# Patient Record
Sex: FEMALE | Race: ASIAN | NOT HISPANIC OR LATINO | URBAN - METROPOLITAN AREA
[De-identification: names, ages, dates, MRNs, and addresses within clinical notes are randomized per-mention and may not be internally consistent; named-entity substitution may affect disease eponyms.]

---

## 2022-08-11 VITALS
SYSTOLIC BLOOD PRESSURE: 161 MMHG | WEIGHT: 126.1 LBS | TEMPERATURE: 98 F | RESPIRATION RATE: 16 BRPM | OXYGEN SATURATION: 99 % | DIASTOLIC BLOOD PRESSURE: 88 MMHG | HEIGHT: 66 IN | HEART RATE: 76 BPM

## 2022-08-11 RX ORDER — CHLORHEXIDINE GLUCONATE 213 G/1000ML
1 SOLUTION TOPICAL ONCE
Refills: 0 | Status: DISCONTINUED | OUTPATIENT
Start: 2022-08-15 | End: 2022-08-30

## 2022-08-11 NOTE — H&P ADULT - HISTORY OF PRESENT ILLNESS
COVID: ________  Cardiologist: Dr. Miguel Gamble   Pharmacy: [facility]  (in [place])  Escort: ________    83 y/o _____ speaking female w/ PMHx HTN, HLD, and Type 2 DM who presented to outpatient cardiologist, Dr. Gamble, endorsing substernal, non-radiating, chest pressure w/ moderate exertion that has been progressively worsening over the past few weeks. Patient denies any dizziness, diaphoresis, syncope, palpitations, SOB/AGUILAR, abdominal pain, nausea/vomiting, melena, LE edema, fever, chills, and cough. Echocardiogram (06/17/2021) showed EF 64%, mild concentric LV hypertrophy, mild AR, mild MR, and mild TR. NST (04/13/2022) revealed moderate perfusion abnormality of moderate intensity in the lateral region and post stress EF 64% w/ normal wall thickness and wall motion.     In light of patient's risk factors, CCS Class II anginal symptoms, and abnormal NST results, patient now presents for cardiac catheterization w/ possible intervention if clinically indicated.  COVID:   Cardiologist: Dr. Miguel Gamble   Pharmacy: [facility]  (in [place])  Escort: ________    83 y/o _____ speaking female w/ PMHx HTN, HLD, and Type 2 DM who presented to outpatient cardiologist, Dr. Gamble, endorsing substernal, non-radiating, chest pressure w/ moderate exertion that has been progressively worsening over the past few weeks. Patient denies any dizziness, diaphoresis, syncope, palpitations, SOB/AGUILAR, abdominal pain, nausea/vomiting, melena, LE edema, fever, chills, and cough. Echocardiogram (06/17/2021) showed EF 64%, mild concentric LV hypertrophy, mild AR, mild MR, and mild TR. NST (04/13/2022) revealed moderate perfusion abnormality of moderate intensity in the lateral region and post stress EF 64% w/ normal wall thickness and wall motion.     In light of patient's risk factors, CCS Class II anginal symptoms, and abnormal NST results, patient now presents for cardiac catheterization w/ possible intervention if clinically indicated.  COVID: Negative 8/11 ([facility])  Cardiologist: Dr. Miguel Gamble   Pharmacy: [facility]  (in [place])  Escort: Son    81 yo Mandarin speaking female w/ PMHx HTN, HLD, and Type 2 DM who presented to outpatient cardiologist, Dr. Gamble, endorsing substernal, non-radiating, chest pressure w/ moderate exertion that has been progressively worsening over the past few weeks. Patient denies any dizziness, diaphoresis, syncope, palpitations, SOB/AGUILAR, abdominal pain, nausea/vomiting, melena, LE edema, fever, chills, and cough. Echocardiogram (06/17/2021) showed EF 64%, mild concentric LV hypertrophy, mild AR, mild MR, and mild TR. NST (04/13/2022) revealed moderate perfusion abnormality of moderate intensity in the lateral region and post stress EF 64% w/ normal wall thickness and wall motion.     In light of patient's risk factors, CCS Class II anginal symptoms, and abnormal NST results, patient now presents for cardiac catheterization w/ possible intervention if clinically indicated.

## 2022-08-11 NOTE — H&P ADULT - ASSESSMENT
83 y/o _____ speaking female w/ PMHx HTN, HLD, and Type 2 DM who is presenting for cardiac catheterization due to patient's risk factors, CCS Class II anginal symptoms, and abnormal NST results.    -H/H: ____. Pt denies BRBPR, hematuria, hematochezia, melena. Pt loaded ___mg ASA x1 and Plavix 600mg x1  -Cr: _____. EF normal. Euvolemic on exam.  ml bolus administered and IVF @ 75cc/hr started pre procedure  -DM: Insulin Sliding Scale Coverage ordered  -Cardiac Catheterization ordered placed   -Home Medication Confirmed via: Medication verified by ____  -Type of sedation: Moderate  -Candidate for sedation: Yes    Risks & benefits of procedure and alternative therapy have been explained to the patient including but not limited to: allergic reaction, bleeding w/possible need for blood transfusion, infection, renal and vascular compromise, limb damage, arrhythmia, stroke, vessel dissection/perforation, Myocardial infarction, emergent CABG. Informed consent obtained and in chart.   81 y/o Mandarin speaking female w/ PMHx HTN, HLD, and Type 2 DM who is presenting for cardiac catheterization due to patient's risk factors, CCS Class II anginal symptoms, and abnormal NST results.    -H/H: 13.2/38.3. Pt denies BRBPR, hematuria, hematochezia, melena. Pt loaded 325mg ASA x1 and Plavix 600mg x1  -Cr: 0.84. EF normal. Euvolemic on exam.  ml bolus administered and IVF @ 75cc/hr started pre procedure  -K 3.7. Ordered for 40 meq KCl x1  -DM: Insulin Sliding Scale Coverage ordered  -Cardiac Catheterization ordered placed   -Home Medication Confirmed via: Medication verified by calling the patient's pharmacy   -Type of sedation: Moderate  -Candidate for sedation: Yes    Risks & benefits of procedure and alternative therapy have been explained to the patient including but not limited to: allergic reaction, bleeding w/possible need for blood transfusion, infection, renal and vascular compromise, limb damage, arrhythmia, stroke, vessel dissection/perforation, Myocardial infarction, emergent CABG. Informed consent obtained and in chart.

## 2022-08-15 ENCOUNTER — OUTPATIENT (OUTPATIENT)
Dept: OUTPATIENT SERVICES | Facility: HOSPITAL | Age: 82
LOS: 1 days | Discharge: HOME | End: 2022-08-15
Payer: MEDICARE

## 2022-08-15 LAB
A1C WITH ESTIMATED AVERAGE GLUCOSE RESULT: 6.1 % — HIGH (ref 4–5.6)
ALBUMIN SERPL ELPH-MCNC: 4.5 G/DL — SIGNIFICANT CHANGE UP (ref 3.3–5)
ALP SERPL-CCNC: 55 U/L — SIGNIFICANT CHANGE UP (ref 40–120)
ALT FLD-CCNC: 19 U/L — SIGNIFICANT CHANGE UP (ref 10–45)
ANION GAP SERPL CALC-SCNC: 11 MMOL/L — SIGNIFICANT CHANGE UP (ref 5–17)
APTT BLD: 32.4 SEC — SIGNIFICANT CHANGE UP (ref 27.5–35.5)
AST SERPL-CCNC: 31 U/L — SIGNIFICANT CHANGE UP (ref 10–40)
BASOPHILS # BLD AUTO: 0.02 K/UL — SIGNIFICANT CHANGE UP (ref 0–0.2)
BASOPHILS NFR BLD AUTO: 0.4 % — SIGNIFICANT CHANGE UP (ref 0–2)
BILIRUB SERPL-MCNC: 0.3 MG/DL — SIGNIFICANT CHANGE UP (ref 0.2–1.2)
BUN SERPL-MCNC: 19 MG/DL — SIGNIFICANT CHANGE UP (ref 7–23)
CALCIUM SERPL-MCNC: 9.7 MG/DL — SIGNIFICANT CHANGE UP (ref 8.4–10.5)
CHLORIDE SERPL-SCNC: 98 MMOL/L — SIGNIFICANT CHANGE UP (ref 96–108)
CHOLEST SERPL-MCNC: 138 MG/DL — SIGNIFICANT CHANGE UP
CK MB CFR SERPL CALC: 3 NG/ML — SIGNIFICANT CHANGE UP (ref 0–6.7)
CK SERPL-CCNC: 146 U/L — SIGNIFICANT CHANGE UP (ref 25–170)
CO2 SERPL-SCNC: 23 MMOL/L — SIGNIFICANT CHANGE UP (ref 22–31)
CREAT SERPL-MCNC: 0.84 MG/DL — SIGNIFICANT CHANGE UP (ref 0.5–1.3)
EGFR: 70 ML/MIN/1.73M2 — SIGNIFICANT CHANGE UP
EOSINOPHIL # BLD AUTO: 0.13 K/UL — SIGNIFICANT CHANGE UP (ref 0–0.5)
EOSINOPHIL NFR BLD AUTO: 2.5 % — SIGNIFICANT CHANGE UP (ref 0–6)
ESTIMATED AVERAGE GLUCOSE: 128 MG/DL — HIGH (ref 68–114)
GLUCOSE BLDC GLUCOMTR-MCNC: 115 MG/DL — HIGH (ref 70–99)
GLUCOSE SERPL-MCNC: 110 MG/DL — HIGH (ref 70–99)
HCT VFR BLD CALC: 38.3 % — SIGNIFICANT CHANGE UP (ref 34.5–45)
HDLC SERPL-MCNC: 63 MG/DL — SIGNIFICANT CHANGE UP
HGB BLD-MCNC: 13.2 G/DL — SIGNIFICANT CHANGE UP (ref 11.5–15.5)
IMM GRANULOCYTES NFR BLD AUTO: 0.2 % — SIGNIFICANT CHANGE UP (ref 0–1.5)
INR BLD: 0.94 — SIGNIFICANT CHANGE UP (ref 0.88–1.16)
LIPID PNL WITH DIRECT LDL SERPL: 34 MG/DL — SIGNIFICANT CHANGE UP
LYMPHOCYTES # BLD AUTO: 2.05 K/UL — SIGNIFICANT CHANGE UP (ref 1–3.3)
LYMPHOCYTES # BLD AUTO: 38.7 % — SIGNIFICANT CHANGE UP (ref 13–44)
MCHC RBC-ENTMCNC: 32 PG — SIGNIFICANT CHANGE UP (ref 27–34)
MCHC RBC-ENTMCNC: 34.5 GM/DL — SIGNIFICANT CHANGE UP (ref 32–36)
MCV RBC AUTO: 93 FL — SIGNIFICANT CHANGE UP (ref 80–100)
MONOCYTES # BLD AUTO: 0.51 K/UL — SIGNIFICANT CHANGE UP (ref 0–0.9)
MONOCYTES NFR BLD AUTO: 9.6 % — SIGNIFICANT CHANGE UP (ref 2–14)
NEUTROPHILS # BLD AUTO: 2.58 K/UL — SIGNIFICANT CHANGE UP (ref 1.8–7.4)
NEUTROPHILS NFR BLD AUTO: 48.6 % — SIGNIFICANT CHANGE UP (ref 43–77)
NON HDL CHOLESTEROL: 75 MG/DL — SIGNIFICANT CHANGE UP
NRBC # BLD: 0 /100 WBCS — SIGNIFICANT CHANGE UP (ref 0–0)
PLATELET # BLD AUTO: 193 K/UL — SIGNIFICANT CHANGE UP (ref 150–400)
POTASSIUM SERPL-MCNC: 3.7 MMOL/L — SIGNIFICANT CHANGE UP (ref 3.5–5.3)
POTASSIUM SERPL-SCNC: 3.7 MMOL/L — SIGNIFICANT CHANGE UP (ref 3.5–5.3)
PROT SERPL-MCNC: 7.7 G/DL — SIGNIFICANT CHANGE UP (ref 6–8.3)
PROTHROM AB SERPL-ACNC: 11.2 SEC — SIGNIFICANT CHANGE UP (ref 10.5–13.4)
RBC # BLD: 4.12 M/UL — SIGNIFICANT CHANGE UP (ref 3.8–5.2)
RBC # FLD: 13.1 % — SIGNIFICANT CHANGE UP (ref 10.3–14.5)
SODIUM SERPL-SCNC: 132 MMOL/L — LOW (ref 135–145)
TRIGL SERPL-MCNC: 205 MG/DL — HIGH
WBC # BLD: 5.3 K/UL — SIGNIFICANT CHANGE UP (ref 3.8–10.5)
WBC # FLD AUTO: 5.3 K/UL — SIGNIFICANT CHANGE UP (ref 3.8–10.5)

## 2022-08-15 PROCEDURE — 93458 L HRT ARTERY/VENTRICLE ANGIO: CPT | Mod: 26

## 2022-08-15 PROCEDURE — 99152 MOD SED SAME PHYS/QHP 5/>YRS: CPT

## 2022-08-15 PROCEDURE — 85730 THROMBOPLASTIN TIME PARTIAL: CPT

## 2022-08-15 PROCEDURE — 93005 ELECTROCARDIOGRAM TRACING: CPT

## 2022-08-15 PROCEDURE — C1769: CPT

## 2022-08-15 PROCEDURE — 85610 PROTHROMBIN TIME: CPT

## 2022-08-15 PROCEDURE — 83036 HEMOGLOBIN GLYCOSYLATED A1C: CPT

## 2022-08-15 PROCEDURE — C1894: CPT

## 2022-08-15 PROCEDURE — 93010 ELECTROCARDIOGRAM REPORT: CPT

## 2022-08-15 PROCEDURE — 82550 ASSAY OF CK (CPK): CPT

## 2022-08-15 PROCEDURE — 82962 GLUCOSE BLOOD TEST: CPT

## 2022-08-15 PROCEDURE — 36415 COLL VENOUS BLD VENIPUNCTURE: CPT

## 2022-08-15 PROCEDURE — 85025 COMPLETE CBC W/AUTO DIFF WBC: CPT

## 2022-08-15 PROCEDURE — 82553 CREATINE MB FRACTION: CPT

## 2022-08-15 PROCEDURE — C1887: CPT

## 2022-08-15 PROCEDURE — 80053 COMPREHEN METABOLIC PANEL: CPT

## 2022-08-15 PROCEDURE — 93458 L HRT ARTERY/VENTRICLE ANGIO: CPT

## 2022-08-15 PROCEDURE — 80061 LIPID PANEL: CPT

## 2022-08-15 RX ORDER — METFORMIN HYDROCHLORIDE 850 MG/1
1 TABLET ORAL
Qty: 0 | Refills: 0 | DISCHARGE

## 2022-08-15 RX ORDER — ROSUVASTATIN CALCIUM 5 MG/1
1 TABLET ORAL
Qty: 0 | Refills: 0 | DISCHARGE

## 2022-08-15 RX ORDER — SODIUM CHLORIDE 9 MG/ML
250 INJECTION INTRAMUSCULAR; INTRAVENOUS; SUBCUTANEOUS ONCE
Refills: 0 | Status: COMPLETED | OUTPATIENT
Start: 2022-08-15 | End: 2022-08-15

## 2022-08-15 RX ORDER — CLOPIDOGREL BISULFATE 75 MG/1
600 TABLET, FILM COATED ORAL ONCE
Refills: 0 | Status: COMPLETED | OUTPATIENT
Start: 2022-08-15 | End: 2022-08-15

## 2022-08-15 RX ORDER — SODIUM CHLORIDE 9 MG/ML
500 INJECTION INTRAMUSCULAR; INTRAVENOUS; SUBCUTANEOUS
Refills: 0 | Status: DISCONTINUED | OUTPATIENT
Start: 2022-08-15 | End: 2022-08-30

## 2022-08-15 RX ORDER — ASPIRIN/CALCIUM CARB/MAGNESIUM 324 MG
325 TABLET ORAL ONCE
Refills: 0 | Status: DISCONTINUED | OUTPATIENT
Start: 2022-08-15 | End: 2022-08-15

## 2022-08-15 RX ORDER — ASPIRIN/CALCIUM CARB/MAGNESIUM 324 MG
1 TABLET ORAL
Qty: 0 | Refills: 0 | DISCHARGE

## 2022-08-15 RX ORDER — POTASSIUM CHLORIDE 20 MEQ
40 PACKET (EA) ORAL ONCE
Refills: 0 | Status: COMPLETED | OUTPATIENT
Start: 2022-08-15 | End: 2022-08-15

## 2022-08-15 RX ORDER — ASPIRIN/CALCIUM CARB/MAGNESIUM 324 MG
81 TABLET ORAL ONCE
Refills: 0 | Status: COMPLETED | OUTPATIENT
Start: 2022-08-15 | End: 2022-08-15

## 2022-08-15 RX ORDER — MIRTAZAPINE 45 MG/1
1 TABLET, ORALLY DISINTEGRATING ORAL
Qty: 0 | Refills: 0 | DISCHARGE

## 2022-08-15 RX ORDER — AZILSARTAN KAMEDOXOMIL AND CHLORTHALIDONE 40; 12.5 MG/1; MG/1
1 TABLET ORAL
Qty: 0 | Refills: 0 | DISCHARGE

## 2022-08-15 RX ORDER — METOPROLOL TARTRATE 50 MG
1 TABLET ORAL
Qty: 0 | Refills: 0 | DISCHARGE

## 2022-08-15 RX ADMIN — CLOPIDOGREL BISULFATE 600 MILLIGRAM(S): 75 TABLET, FILM COATED ORAL at 15:59

## 2022-08-15 RX ADMIN — Medication 81 MILLIGRAM(S): at 15:59

## 2022-08-15 RX ADMIN — SODIUM CHLORIDE 100 MILLILITER(S): 9 INJECTION INTRAMUSCULAR; INTRAVENOUS; SUBCUTANEOUS at 17:15

## 2022-08-15 RX ADMIN — Medication 40 MILLIEQUIVALENT(S): at 15:59

## 2022-08-15 RX ADMIN — SODIUM CHLORIDE 500 MILLILITER(S): 9 INJECTION INTRAMUSCULAR; INTRAVENOUS; SUBCUTANEOUS at 14:26

## 2022-08-15 RX ADMIN — SODIUM CHLORIDE 75 MILLILITER(S): 9 INJECTION INTRAMUSCULAR; INTRAVENOUS; SUBCUTANEOUS at 14:26

## 2022-08-15 NOTE — PROGRESS NOTE ADULT - SUBJECTIVE AND OBJECTIVE BOX
Interventional Cardiology PA SDA Discharge Note    Patient without complaints. Ambulated and voided without difficulties    Afebrile, VSS    Ext:    		  		        Right     Radial :   no  hematoma, no     bleeding, dressing; C/D/I      Pulses:    intact RAD to baseline     A/P:      81 yo Mandarin speaking female w/ PMHx HTN, HLD, and Type 2 DM who presented to outpatient cardiologist, Dr. Gamble, endorsing substernal, non-radiating, chest pressure w/ moderate exertion that has been progressively worsening over the past few weeks. Patient denies any dizziness, diaphoresis, syncope, palpitations, SOB/AGUILAR, abdominal pain, nausea/vomiting, melena, LE edema, fever, chills, and cough. Echocardiogram (06/17/2021) showed EF 64%, mild concentric LV hypertrophy, mild AR, mild MR, and mild TR. NST (04/13/2022) revealed moderate perfusion abnormality of moderate intensity in the lateral region and post stress EF 64% w/ normal wall thickness and wall motion.     In light of patient's risk factors, CCS Class II anginal symptoms, and abnormal NST results, patient now presents for cardiac catheterization w/ possible intervention if clinically indicated.   Pt. s/p diagnostic cardiac cath @ [facility] 8/15/22: oD1 60 %, mild LI elsewhere, EDP 7           1.	Stable for discharge as per attending  __Devendra_______ after bed rest, pt voids, wrist stable and 30 minutes of ambulation.  2.	Follow-up with PMD/Cardiologist  Dr. Gamble___________ in 1-2 weeks  3.	Discharged forms signed and copies in chart

## 2022-08-16 DIAGNOSIS — I20.0 UNSTABLE ANGINA: ICD-10-CM

## 2022-08-16 DIAGNOSIS — R94.39 ABNORMAL RESULT OF OTHER CARDIOVASCULAR FUNCTION STUDY: ICD-10-CM
